# Patient Record
Sex: FEMALE | URBAN - METROPOLITAN AREA
[De-identification: names, ages, dates, MRNs, and addresses within clinical notes are randomized per-mention and may not be internally consistent; named-entity substitution may affect disease eponyms.]

---

## 2021-08-13 ENCOUNTER — OFFICE VISIT (OUTPATIENT)
Dept: URBAN - METROPOLITAN AREA CLINIC 24 | Facility: CLINIC | Age: 76
End: 2021-08-13
Payer: MEDICARE

## 2021-08-13 DIAGNOSIS — H40.013 OPEN ANGLE WITH BORDERLINE FINDINGS, LOW RISK, BILATERAL: ICD-10-CM

## 2021-08-13 DIAGNOSIS — E11.9 TYPE 2 DIABETES MELLITUS WITHOUT COMPLICATIONS: Primary | ICD-10-CM

## 2021-08-13 PROCEDURE — 92134 CPTRZ OPH DX IMG PST SGM RTA: CPT | Performed by: OPTOMETRIST

## 2021-08-13 PROCEDURE — 92133 CPTRZD OPH DX IMG PST SGM ON: CPT | Performed by: OPTOMETRIST

## 2021-08-13 PROCEDURE — 99204 OFFICE O/P NEW MOD 45 MIN: CPT | Performed by: OPTOMETRIST

## 2021-08-13 ASSESSMENT — KERATOMETRY
OS: 44.84
OD: 44.62

## 2021-08-13 ASSESSMENT — INTRAOCULAR PRESSURE
OD: 15
OS: 15

## 2021-08-13 ASSESSMENT — VISUAL ACUITY
OS: 20/25
OD: 20/40

## 2021-08-13 NOTE — IMPRESSION/PLAN
Impression: Open angle with borderline findings, low risk, bilateral: H40.013. 
-- + K spindle ou, cupping
-- fohx glc negative
-- Tmax 15 OU Plan: Baseline rnfl oct today; abnormal.  
Recommend baseline HVF 24-2 prior to sx. Ongoing primary eye care CELESTE Wheatley, OD.

## 2021-08-13 NOTE — IMPRESSION/PLAN
Impression: Combined forms of age-related cataract, bilateral: H25.813. Plan: Cataract accounts for patient's complaint. Discussed treatment options. Surgical treatment is recommended. Surgical risk and benefits discussed. Patient elects surgical treatment. Pt is a candidate for multifocal, toric, standard, LenSx and ORA. -- Pt referred by CELESTE Wheatley, OD (+ comanages) -- aim not discussed // myopic pt

## 2021-08-13 NOTE — IMPRESSION/PLAN
Impression: Type 2 diabetes mellitus without complications: W57.5. Plan: No Non- Proliferative Diabetic Retinopathy , no Diabetic Macular Edema and no Neovascularization of the iris, disc, or elsewhere. Discussed ocular and systemic benefits of blood sugar control. Check annually.

## 2021-08-17 ENCOUNTER — OFFICE VISIT (OUTPATIENT)
Dept: URBAN - METROPOLITAN AREA CLINIC 24 | Facility: CLINIC | Age: 76
End: 2021-08-17
Payer: MEDICARE

## 2021-08-17 PROCEDURE — 76514 ECHO EXAM OF EYE THICKNESS: CPT | Performed by: OPTOMETRIST

## 2021-08-17 PROCEDURE — 99214 OFFICE O/P EST MOD 30 MIN: CPT | Performed by: OPTOMETRIST

## 2021-08-17 PROCEDURE — 92020 GONIOSCOPY: CPT | Performed by: OPTOMETRIST

## 2021-08-17 PROCEDURE — 92083 EXTENDED VISUAL FIELD XM: CPT | Performed by: OPTOMETRIST

## 2021-08-17 RX ORDER — LATANOPROST 50 UG/ML
0.005 % SOLUTION OPHTHALMIC
Qty: 7.5 | Refills: 3 | Status: INACTIVE
Start: 2021-08-17 | End: 2022-01-27

## 2021-08-17 ASSESSMENT — INTRAOCULAR PRESSURE
OS: 18
OD: 18

## 2021-08-17 NOTE — IMPRESSION/PLAN
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
-- + K spindle ou, cupping, PACHS OD: 533, OS: 532
-- fohx glc negative
-- Tmax 18 OU
-- gonio 8/17/21; Open to CBB OU, 3+ TM pigment Plan: Baseline rnfl oct 8/13/21; thinning ou Baseline HVF 24-2 8/17/21 OD: dense superior arc defect OS: watch for thai ns Considering CDR, rnfl oct, HVF 24-2 and k spindles; recommend pt start tx. START:
1. latanoprost qhs ou (started 8/17/21) Okay to proceed w/ C&I OU as scheduled -- recommend MIGs

## 2021-08-19 ENCOUNTER — TESTING ONLY (OUTPATIENT)
Dept: URBAN - METROPOLITAN AREA CLINIC 24 | Facility: CLINIC | Age: 76
End: 2021-08-19
Payer: MEDICARE

## 2021-08-19 PROCEDURE — 76519 ECHO EXAM OF EYE: CPT | Performed by: OPHTHALMOLOGY

## 2021-08-19 ASSESSMENT — PACHYMETRY
OS: 25.10
OD: 25.23
OD: 3.52
OS: 3.56

## 2021-08-30 ENCOUNTER — ADULT PHYSICAL (OUTPATIENT)
Dept: URBAN - METROPOLITAN AREA CLINIC 24 | Facility: CLINIC | Age: 76
End: 2021-08-30
Payer: MEDICARE

## 2021-08-30 DIAGNOSIS — H25.813 COMBINED FORMS OF AGE-RELATED CATARACT, BILATERAL: ICD-10-CM

## 2021-08-30 DIAGNOSIS — Z01.818 ENCOUNTER FOR OTHER PREPROCEDURAL EXAMINATION: Primary | ICD-10-CM

## 2021-08-30 PROCEDURE — 99203 OFFICE O/P NEW LOW 30 MIN: CPT | Performed by: PHYSICIAN ASSISTANT

## 2021-09-09 ENCOUNTER — OFFICE VISIT (OUTPATIENT)
Dept: URBAN - METROPOLITAN AREA CLINIC 24 | Facility: CLINIC | Age: 76
End: 2021-09-09
Payer: MEDICARE

## 2021-09-09 DIAGNOSIS — H40.1332 PIGMENTARY GLAUCOMA, BILATERAL, MODERATE STAGE: Primary | ICD-10-CM

## 2021-09-09 DIAGNOSIS — H25.11 AGE-RELATED NUCLEAR CATARACT, RIGHT EYE: ICD-10-CM

## 2021-09-09 PROCEDURE — 99204 OFFICE O/P NEW MOD 45 MIN: CPT | Performed by: OPHTHALMOLOGY

## 2021-09-09 ASSESSMENT — VISUAL ACUITY
OD: 20/30
OS: 20/25

## 2021-09-09 ASSESSMENT — KERATOMETRY
OD: 44.62
OS: 44.99

## 2021-09-10 NOTE — IMPRESSION/PLAN
Impression: Age-related nuclear cataract, left eye: H25.12. Plan: Discussed cataract diagnosis with the patient. Discussed risks, benefits and alternatives to surgery including but not limited to: bleeding, infection, risk of vision loss, loss of the eye, need for other surgery. Patient voiced understanding and wishes to proceed. Patient elects surgical treatment. Specialty lens options discussed and pt declines. Patient desires surgery OU (( AIM  Distance OU,)) Patient understands the need for glasses after surgery for BCVA. MIGS Discussed with pt limitations of MIGS device and it does not replace  Glaucoma eye drops and would have to continue treatment and would still need glasses after surgery. Understands possible use of iris stretch. Left eye second(PC IOL (Standard W/ IStent ) (+)Dexy 15 Minutes

## 2021-09-10 NOTE — IMPRESSION/PLAN
Impression: Pigmentary glaucoma, bilateral, moderate stage: N01.1208.
-- + K spindle ou, cupping, PACHS OD: 533, OS: 532
-- fohx glc negative
-- Tmax 18 OU
-- gonio 8/17/21; Open to CBB OU, 3+ TM pigment Plan: Pt has Glaucoma    Gonio :        Pachs:531/544      Today's IOP : 12/14        Tmax  :  18/18 Target IOP low to mid teens Pt denies Fhx of Glaucoma Left eye is the better seeing eye Last vf OD total loss of superior nasal with encroachment to temporal OS: Nasal 8/17/21step C/D:  
OCT:
Pt denies Sulfa Allergy   // Pt denies Lung /Heart dx Plan :
1. Patient reports she was not using Latanoprost QHS OU 2.  Recommend Cat W/ MIGS (See cat assessment)

## 2021-09-10 NOTE — IMPRESSION/PLAN
Impression: Age-related nuclear cataract, right eye: H25.11. Plan: Discussed cataract diagnosis with the patient. Discussed risks, benefits and alternatives to surgery including but not limited to: bleeding, infection, risk of vision loss, loss of the eye, need for other surgery. Patient voiced understanding and wishes to proceed. Patient elects surgical treatment. Specialty lens options discussed and pt declines. Patient desires surgery OU (( AIM  Distance OU,)) Patient understands the need for glasses after surgery for BCVA. MIGS Discussed with pt limitations of MIGS device and it does not replace  Glaucoma eye drops and would have to continue treatment and would still need glasses after surgery. Understands possible use of iris stretch. Right eye first (PC IOL (Standard W/ IStent )(+)Dexy 10 Minutes

## 2021-09-28 ENCOUNTER — SURGERY (OUTPATIENT)
Dept: URBAN - METROPOLITAN AREA SURGERY 12 | Facility: SURGERY | Age: 76
End: 2021-09-28
Payer: MEDICARE

## 2021-09-28 PROCEDURE — 66982 XCAPSL CTRC RMVL CPLX WO ECP: CPT | Performed by: OPHTHALMOLOGY

## 2021-09-28 PROCEDURE — 0191T INSERTION OF ANTERIOR SEGMENT AQUEOUS DRAINAGE DEVICE, W/OUT EXTRAOCULAR RESERVO: CPT | Performed by: OPHTHALMOLOGY

## 2021-09-29 ENCOUNTER — POST-OPERATIVE VISIT (OUTPATIENT)
Dept: URBAN - METROPOLITAN AREA CLINIC 24 | Facility: CLINIC | Age: 76
End: 2021-09-29
Payer: MEDICARE

## 2021-09-29 PROCEDURE — 99024 POSTOP FOLLOW-UP VISIT: CPT | Performed by: OPTOMETRIST

## 2021-09-29 ASSESSMENT — INTRAOCULAR PRESSURE: OD: 18

## 2021-10-07 ENCOUNTER — POST-OPERATIVE VISIT (OUTPATIENT)
Dept: URBAN - METROPOLITAN AREA CLINIC 24 | Facility: CLINIC | Age: 76
End: 2021-10-07
Payer: MEDICARE

## 2021-10-07 PROCEDURE — 99213 OFFICE O/P EST LOW 20 MIN: CPT | Performed by: PHYSICIAN ASSISTANT

## 2021-10-07 PROCEDURE — 99024 POSTOP FOLLOW-UP VISIT: CPT | Performed by: OPTOMETRIST

## 2021-10-07 ASSESSMENT — VISUAL ACUITY
OS: 20/25
OD: 20/25

## 2021-10-07 ASSESSMENT — INTRAOCULAR PRESSURE
OS: 18
OD: 9
OS: 18
OD: 9

## 2021-10-07 NOTE — IMPRESSION/PLAN
Impression: S/P Cataract Extraction by phacoemulsification with IOL placement/iStent OD - 9 Days. Encounter for surgical aftercare following surgery on a sense organ  Z48.810. Excellent post op course   Condition is improving - Plan:  In OS; continue latanoprost QHS OS ONLY as directed

## 2021-10-27 ENCOUNTER — POST-OPERATIVE VISIT (OUTPATIENT)
Dept: URBAN - METROPOLITAN AREA CLINIC 30 | Facility: CLINIC | Age: 76
End: 2021-10-27
Payer: MEDICARE

## 2021-10-27 DIAGNOSIS — Z48.810 ENCOUNTER FOR SURGICAL AFTERCARE FOLLOWING SURGERY ON A SENSE ORGAN: Primary | ICD-10-CM

## 2021-10-27 PROCEDURE — 99024 POSTOP FOLLOW-UP VISIT: CPT | Performed by: OPTOMETRIST

## 2021-10-27 RX ORDER — PREDNISOLONE ACETATE 10 MG/ML
1 % SUSPENSION/ DROPS OPHTHALMIC
Qty: 10 | Refills: 1 | Status: INACTIVE
Start: 2021-10-27 | End: 2022-01-27

## 2021-10-27 ASSESSMENT — INTRAOCULAR PRESSURE
OD: 13
OS: 17

## 2021-10-27 NOTE — IMPRESSION/PLAN
Impression: S/P Cataract Extraction by phacoemulsification with IOL placement/iStent OD - 29 Days. Encounter for surgical aftercare following surgery on a sense organ  Z48.810. Plan: *pt postponed 2nd eye surgery due to feeling ill. Iritis, PCO, and floaters OD noted on exam today. Patient educated. Pt has experienced flashes. Retina is flat and attached. S/sx RD reviewed. Start PA QID OD/TID/BID/QD OD x 1 week each. Resume latanoprost qhs OS. Continue ATs QID OU. Check in 2-3 weeks- DILATE OD. Pt prefers [facility] location.

## 2022-01-20 ENCOUNTER — ADULT PHYSICAL (OUTPATIENT)
Dept: URBAN - METROPOLITAN AREA CLINIC 24 | Facility: CLINIC | Age: 77
End: 2022-01-20
Payer: MEDICARE

## 2022-01-20 PROCEDURE — 99213 OFFICE O/P EST LOW 20 MIN: CPT | Performed by: PHYSICIAN ASSISTANT

## 2022-01-27 ENCOUNTER — PRE-OPERATIVE VISIT (OUTPATIENT)
Dept: URBAN - METROPOLITAN AREA CLINIC 24 | Facility: CLINIC | Age: 77
End: 2022-01-27
Payer: MEDICARE

## 2022-01-27 PROCEDURE — 99213 OFFICE O/P EST LOW 20 MIN: CPT | Performed by: OPHTHALMOLOGY

## 2022-01-27 ASSESSMENT — INTRAOCULAR PRESSURE
OS: 18
OD: 15

## 2022-01-27 ASSESSMENT — VISUAL ACUITY
OS: 20/30
OD: 20/20

## 2022-01-27 NOTE — IMPRESSION/PLAN
Impression: Pigmentary glaucoma, bilateral, moderate stage: W21.4167.
- s/p Cataract Extraction by phacoemulsification with IOL placement/iStent OD 2021 Plan: Pt has Glaucoma    Gonio :        Pachs:531/544      Today's IOP : 12/14        Tmax  :  18/18 Target IOP low to mid teens Pt denies Fhx of Glaucoma Left eye is the better seeing eye Last vf OD total loss of superior nasal with encroachment to temporal OS: Nasal 8/17/21step C/D: .6X.5 OU
OCT: 70/74 08/13/21 Pt denies Sulfa Allergy // Pt denies Lung /Heart dx Plan :
1. Patient reports she was not using Latanoprost QHS OU 2. Patient is doing well after cat sx with IOL placement/iStent OD 3.  As scheduled for cat sx OS

## 2022-01-27 NOTE — IMPRESSION/PLAN
Impression: Age-related nuclear cataract, left eye: H25.12. Plan: Discussed cataract diagnosis with the patient. Discussed risks, benefits and alternatives to surgery including but not limited to: bleeding, infection, risk of vision loss, loss of the eye, need for other surgery. Patient voiced understanding and wishes to proceed. Patient elects surgical treatment. Specialty lens options discussed and pt declines. Patient desires surgery OS (( NEAR AIM -1.50 OS,)) Patient understands the need for glasses after surgery for BCVA. MIGS Discussed with pt limitations of MIGS device and it does not replace  Glaucoma eye drops and would have to continue treatment and would still need glasses after surgery. Understands possible use of iris stretch. Left eye second(PC IOL (Standard W/ IStent ) (+)Dexy 15 Minutes

## 2022-03-03 ENCOUNTER — ADULT PHYSICAL (OUTPATIENT)
Dept: URBAN - METROPOLITAN AREA CLINIC 24 | Facility: CLINIC | Age: 77
End: 2022-03-03
Payer: MEDICARE

## 2022-03-03 PROCEDURE — 99213 OFFICE O/P EST LOW 20 MIN: CPT | Performed by: PHYSICIAN ASSISTANT

## 2022-03-10 ENCOUNTER — SURGERY (OUTPATIENT)
Dept: URBAN - METROPOLITAN AREA SURGERY 12 | Facility: SURGERY | Age: 77
End: 2022-03-10
Payer: MEDICARE

## 2022-03-10 DIAGNOSIS — H25.12 AGE-RELATED NUCLEAR CATARACT, LEFT EYE: Primary | ICD-10-CM

## 2022-03-10 PROCEDURE — 66991 XCAPSL CTRC RMVL INSJ 1+: CPT | Performed by: OPHTHALMOLOGY

## 2022-03-11 ENCOUNTER — POST-OPERATIVE VISIT (OUTPATIENT)
Dept: URBAN - METROPOLITAN AREA CLINIC 24 | Facility: CLINIC | Age: 77
End: 2022-03-11
Payer: MEDICARE

## 2022-03-11 PROCEDURE — 99024 POSTOP FOLLOW-UP VISIT: CPT | Performed by: STUDENT IN AN ORGANIZED HEALTH CARE EDUCATION/TRAINING PROGRAM

## 2022-03-11 RX ORDER — OFLOXACIN 3 MG/ML
0.3 % SOLUTION/ DROPS OPHTHALMIC
Qty: 5 | Refills: 0 | Status: ACTIVE
Start: 2022-03-11

## 2022-03-11 ASSESSMENT — INTRAOCULAR PRESSURE: OS: 12

## 2022-03-11 NOTE — IMPRESSION/PLAN
Impression: S/P Cataract Extraction by phacoemulsification with IOL placement/iStent OS - 1 Day. Encounter for surgical aftercare following surgery on a sense organ  Z48.810.  Post operative instructions reviewed - Condition is improving - Plan: Oval abrasion sup, start ofloxacin tid OS only

## 2022-04-07 ENCOUNTER — POST-OPERATIVE VISIT (OUTPATIENT)
Dept: URBAN - METROPOLITAN AREA CLINIC 24 | Facility: CLINIC | Age: 77
End: 2022-04-07
Payer: MEDICARE

## 2022-04-07 PROCEDURE — 99024 POSTOP FOLLOW-UP VISIT: CPT | Performed by: STUDENT IN AN ORGANIZED HEALTH CARE EDUCATION/TRAINING PROGRAM

## 2022-04-07 ASSESSMENT — INTRAOCULAR PRESSURE
OD: 14
OS: 14

## 2022-04-07 ASSESSMENT — VISUAL ACUITY
OS: 20/25
OD: 20/25

## 2022-04-07 NOTE — IMPRESSION/PLAN
Impression: S/P Cataract Extraction by phacoemulsification with IOL placement/iStent OS - 28 Days. Encounter for surgical aftercare following surgery on a sense organ  Z48.810. Post operative instructions reviewed - Condition is improving - Plan: Abrasion OS well healed. Cont art tears qid+ prn OU Rel SRX per pt request

## 2022-06-29 ENCOUNTER — OFFICE VISIT (OUTPATIENT)
Dept: URBAN - METROPOLITAN AREA CLINIC 24 | Facility: CLINIC | Age: 77
End: 2022-06-29
Payer: MEDICARE

## 2022-06-29 DIAGNOSIS — H35.371 PUCKERING OF MACULA, RIGHT EYE: ICD-10-CM

## 2022-06-29 DIAGNOSIS — H40.1332 PIGMENTARY GLAUCOMA, BILATERAL, MODERATE STAGE: Primary | ICD-10-CM

## 2022-06-29 DIAGNOSIS — H26.493 OTHER SECONDARY CATARACT, BILATERAL: ICD-10-CM

## 2022-06-29 DIAGNOSIS — H26.491 OTHER SECONDARY CATARACT, RIGHT EYE: ICD-10-CM

## 2022-06-29 PROCEDURE — 92134 CPTRZ OPH DX IMG PST SGM RTA: CPT | Performed by: OPTOMETRIST

## 2022-06-29 PROCEDURE — 99214 OFFICE O/P EST MOD 30 MIN: CPT | Performed by: OPTOMETRIST

## 2022-06-29 ASSESSMENT — KERATOMETRY
OD: 44.82
OS: 45.20

## 2022-06-29 ASSESSMENT — INTRAOCULAR PRESSURE
OD: 17
OS: 18

## 2022-06-29 ASSESSMENT — VISUAL ACUITY
OD: 20/25
OS: 20/20

## 2022-06-29 NOTE — IMPRESSION/PLAN
Impression: Pigmentary glaucoma, bilateral, moderate stage: E05.9293.
-- s/p CE w/ iStent OD: 9/28/21, OS: 3/10/22; DEIDRE Hunt MD
-- + K spindle ou, cupping, PACHS OD: 533, OS: 532
-- fohx glc negative
-- Tmax 18 OU
-- gonio 8/17/21; Open to CBB OU, 3+ TM pigment
-- target IOP low teens Plan: Pt edu. Pt off topicals since C&I. Continue STRICT observation w/ Dr. Claudetta Jenkins -- pt advised restarting latanoprost may be indicated.

## 2022-06-29 NOTE — IMPRESSION/PLAN
Impression: Other secondary cataract, OD>OS; H26.493. OD: AIM plano, OS: -1.50  Plan: Opacified capsule with option for Yag laser capsulotomy. Discussed procedure, risks, benefits and side effects. Patient request Yag laser capsulotomy OD only.

## 2022-06-29 NOTE — IMPRESSION/PLAN
Impression: Puckering of macula, right eye: H35.371. Plan: MILD ERM present. No cme, thickening, or complaints of metamorphopsia. Pt advised of condition. Will monitor. Notify clinic if any changes noted.

## 2023-07-06 ENCOUNTER — OFFICE VISIT (OUTPATIENT)
Dept: URBAN - METROPOLITAN AREA CLINIC 24 | Facility: CLINIC | Age: 78
End: 2023-07-06
Payer: MEDICARE

## 2023-07-06 DIAGNOSIS — H40.1332 PIGMENTARY GLAUCOMA, BILATERAL, MODERATE STAGE: ICD-10-CM

## 2023-07-06 DIAGNOSIS — E11.9 TYPE 2 DIABETES MELLITUS WITHOUT COMPLICATIONS: Primary | ICD-10-CM

## 2023-07-06 DIAGNOSIS — H16.143 PUNCTATE KERATITIS, BILATERAL: ICD-10-CM

## 2023-07-06 DIAGNOSIS — H35.371 PUCKERING OF MACULA, RIGHT EYE: ICD-10-CM

## 2023-07-06 DIAGNOSIS — H26.492 OTHER SECONDARY CATARACT, LEFT EYE: ICD-10-CM

## 2023-07-06 PROCEDURE — 92133 CPTRZD OPH DX IMG PST SGM ON: CPT | Performed by: OPTOMETRIST

## 2023-07-06 PROCEDURE — 92083 EXTENDED VISUAL FIELD XM: CPT | Performed by: OPTOMETRIST

## 2023-07-06 PROCEDURE — 99214 OFFICE O/P EST MOD 30 MIN: CPT | Performed by: OPTOMETRIST

## 2023-07-06 RX ORDER — LATANOPROST 50 UG/ML
0.005 % SOLUTION OPHTHALMIC
Qty: 7.5 | Refills: 3 | Status: ACTIVE
Start: 2023-07-06

## 2023-07-06 ASSESSMENT — VISUAL ACUITY
OD: 20/25
OS: 20/25

## 2023-07-06 ASSESSMENT — INTRAOCULAR PRESSURE
OS: 17
OD: 16

## 2023-07-06 NOTE — IMPRESSION/PLAN
Impression: Punctate keratitis, bilateral: H16.143. Plan: recommend continue consistent use of afts.

## 2023-07-06 NOTE — IMPRESSION/PLAN
Impression: Type 2 diabetes mellitus without complications: W91.9. Plan: No Non- Proliferative Diabetic Retinopathy , no Diabetic Macular Edema and no Neovascularization of the iris, disc, or elsewhere. Discussed ocular and systemic benefits of blood sugar control. Check annually.

## 2023-07-06 NOTE — IMPRESSION/PLAN
Impression: Pigmentary glaucoma, bilateral, moderate stage: Z65.9884 -- NTG OU
-- s/p CE w/ iStent OD: 9/28/21, OS: 3/10/22; DEIDRE Burgess MD
-- pt referred back to [facility] by Dr. Hilaria Garcia July '23
-- + K spindle ou, cupping, PACHS OD: 533, OS: 532
-- fohx glc negative
-- Tmax 18 OU
-- gonio 8/17/21; Open to CBB OU, 3+ TM pigment
-- target IOP low teens Plan: Updated HVF 24-2 and rnfl oct -- progression ou
IOP today: 16/17 Pt edu; diagnostics demonstrated START:
1. latanoprost qhs ou See 2 months

## 2023-07-06 NOTE — IMPRESSION/PLAN
Impression: Other secondary cataract, left eye: H26.492.
-- s/p yag OD
-- aim OD: plano, OS: -1.50 Plan: Opacified capsule not affecting vision. No indication for treatment. Return if decreased vision. Pt advised.